# Patient Record
Sex: MALE | Race: WHITE | NOT HISPANIC OR LATINO | Employment: FULL TIME | URBAN - METROPOLITAN AREA
[De-identification: names, ages, dates, MRNs, and addresses within clinical notes are randomized per-mention and may not be internally consistent; named-entity substitution may affect disease eponyms.]

---

## 2022-03-07 ENCOUNTER — APPOINTMENT (OUTPATIENT)
Dept: RADIOLOGY | Facility: MEDICAL CENTER | Age: 27
End: 2022-03-07
Attending: EMERGENCY MEDICINE
Payer: COMMERCIAL

## 2022-03-07 ENCOUNTER — HOSPITAL ENCOUNTER (EMERGENCY)
Facility: MEDICAL CENTER | Age: 27
End: 2022-03-07
Attending: EMERGENCY MEDICINE
Payer: COMMERCIAL

## 2022-03-07 VITALS
HEIGHT: 74 IN | OXYGEN SATURATION: 97 % | BODY MASS INDEX: 23.48 KG/M2 | TEMPERATURE: 97.5 F | WEIGHT: 182.98 LBS | DIASTOLIC BLOOD PRESSURE: 65 MMHG | SYSTOLIC BLOOD PRESSURE: 107 MMHG | HEART RATE: 76 BPM | RESPIRATION RATE: 16 BRPM

## 2022-03-07 DIAGNOSIS — R10.84 GENERALIZED ABDOMINAL PAIN: ICD-10-CM

## 2022-03-07 DIAGNOSIS — Z87.19 HISTORY OF CROHN'S DISEASE: ICD-10-CM

## 2022-03-07 LAB
ALBUMIN SERPL BCP-MCNC: 4.5 G/DL (ref 3.2–4.9)
ALBUMIN/GLOB SERPL: 1.7 G/DL
ALP SERPL-CCNC: 51 U/L (ref 30–99)
ALT SERPL-CCNC: 10 U/L (ref 2–50)
ANION GAP SERPL CALC-SCNC: 11 MMOL/L (ref 7–16)
APPEARANCE UR: CLEAR
AST SERPL-CCNC: 16 U/L (ref 12–45)
BASOPHILS # BLD AUTO: 0.5 % (ref 0–1.8)
BASOPHILS # BLD: 0.04 K/UL (ref 0–0.12)
BILIRUB SERPL-MCNC: 0.3 MG/DL (ref 0.1–1.5)
BILIRUB UR QL STRIP.AUTO: NEGATIVE
BUN SERPL-MCNC: 10 MG/DL (ref 8–22)
CALCIUM SERPL-MCNC: 9.3 MG/DL (ref 8.4–10.2)
CHLORIDE SERPL-SCNC: 102 MMOL/L (ref 96–112)
CO2 SERPL-SCNC: 25 MMOL/L (ref 20–33)
COLOR UR: YELLOW
CREAT SERPL-MCNC: 0.68 MG/DL (ref 0.5–1.4)
EOSINOPHIL # BLD AUTO: 0.25 K/UL (ref 0–0.51)
EOSINOPHIL NFR BLD: 3.1 % (ref 0–6.9)
ERYTHROCYTE [DISTWIDTH] IN BLOOD BY AUTOMATED COUNT: 38.7 FL (ref 35.9–50)
GLOBULIN SER CALC-MCNC: 2.7 G/DL (ref 1.9–3.5)
GLUCOSE SERPL-MCNC: 112 MG/DL (ref 65–99)
GLUCOSE UR STRIP.AUTO-MCNC: NEGATIVE MG/DL
HCT VFR BLD AUTO: 40.5 % (ref 42–52)
HGB BLD-MCNC: 13.4 G/DL (ref 14–18)
IMM GRANULOCYTES # BLD AUTO: 0.02 K/UL (ref 0–0.11)
IMM GRANULOCYTES NFR BLD AUTO: 0.2 % (ref 0–0.9)
KETONES UR STRIP.AUTO-MCNC: NEGATIVE MG/DL
LEUKOCYTE ESTERASE UR QL STRIP.AUTO: NEGATIVE
LIPASE SERPL-CCNC: 13 U/L (ref 7–58)
LYMPHOCYTES # BLD AUTO: 1.89 K/UL (ref 1–4.8)
LYMPHOCYTES NFR BLD: 23.3 % (ref 22–41)
MCH RBC QN AUTO: 28.3 PG (ref 27–33)
MCHC RBC AUTO-ENTMCNC: 33.1 G/DL (ref 33.7–35.3)
MCV RBC AUTO: 85.4 FL (ref 81.4–97.8)
MICRO URNS: NORMAL
MONOCYTES # BLD AUTO: 0.45 K/UL (ref 0–0.85)
MONOCYTES NFR BLD AUTO: 5.5 % (ref 0–13.4)
NEUTROPHILS # BLD AUTO: 5.46 K/UL (ref 1.82–7.42)
NEUTROPHILS NFR BLD: 67.4 % (ref 44–72)
NITRITE UR QL STRIP.AUTO: NEGATIVE
NRBC # BLD AUTO: 0 K/UL
NRBC BLD-RTO: 0 /100 WBC
PH UR STRIP.AUTO: 7 [PH] (ref 5–8)
PLATELET # BLD AUTO: 333 K/UL (ref 164–446)
PMV BLD AUTO: 9.8 FL (ref 9–12.9)
POTASSIUM SERPL-SCNC: 4 MMOL/L (ref 3.6–5.5)
PROT SERPL-MCNC: 7.2 G/DL (ref 6–8.2)
PROT UR QL STRIP: NEGATIVE MG/DL
RBC # BLD AUTO: 4.74 M/UL (ref 4.7–6.1)
RBC UR QL AUTO: NEGATIVE
SODIUM SERPL-SCNC: 138 MMOL/L (ref 135–145)
SP GR UR STRIP.AUTO: 1.02
WBC # BLD AUTO: 8.1 K/UL (ref 4.8–10.8)

## 2022-03-07 PROCEDURE — 96374 THER/PROPH/DIAG INJ IV PUSH: CPT

## 2022-03-07 PROCEDURE — 83690 ASSAY OF LIPASE: CPT

## 2022-03-07 PROCEDURE — 74177 CT ABD & PELVIS W/CONTRAST: CPT

## 2022-03-07 PROCEDURE — 700111 HCHG RX REV CODE 636 W/ 250 OVERRIDE (IP): Performed by: EMERGENCY MEDICINE

## 2022-03-07 PROCEDURE — 85025 COMPLETE CBC W/AUTO DIFF WBC: CPT

## 2022-03-07 PROCEDURE — 81003 URINALYSIS AUTO W/O SCOPE: CPT

## 2022-03-07 PROCEDURE — 700105 HCHG RX REV CODE 258: Performed by: EMERGENCY MEDICINE

## 2022-03-07 PROCEDURE — 99285 EMERGENCY DEPT VISIT HI MDM: CPT

## 2022-03-07 PROCEDURE — 80053 COMPREHEN METABOLIC PANEL: CPT

## 2022-03-07 PROCEDURE — 96375 TX/PRO/DX INJ NEW DRUG ADDON: CPT

## 2022-03-07 PROCEDURE — 36415 COLL VENOUS BLD VENIPUNCTURE: CPT

## 2022-03-07 PROCEDURE — 700117 HCHG RX CONTRAST REV CODE 255: Performed by: EMERGENCY MEDICINE

## 2022-03-07 RX ORDER — OMEPRAZOLE 40 MG/1
40 CAPSULE, DELAYED RELEASE ORAL DAILY
Qty: 30 CAPSULE | Refills: 0 | Status: SHIPPED | OUTPATIENT
Start: 2022-03-07

## 2022-03-07 RX ORDER — ONDANSETRON 2 MG/ML
4 INJECTION INTRAMUSCULAR; INTRAVENOUS ONCE
Status: COMPLETED | OUTPATIENT
Start: 2022-03-07 | End: 2022-03-07

## 2022-03-07 RX ORDER — HYDROCODONE BITARTRATE AND ACETAMINOPHEN 5; 325 MG/1; MG/1
1 TABLET ORAL EVERY 4 HOURS PRN
Qty: 10 TABLET | Refills: 0 | Status: SHIPPED | OUTPATIENT
Start: 2022-03-07 | End: 2022-03-10

## 2022-03-07 RX ORDER — SODIUM CHLORIDE 9 MG/ML
1000 INJECTION, SOLUTION INTRAVENOUS ONCE
Status: COMPLETED | OUTPATIENT
Start: 2022-03-07 | End: 2022-03-07

## 2022-03-07 RX ORDER — MORPHINE SULFATE 10 MG/ML
8 INJECTION, SOLUTION INTRAMUSCULAR; INTRAVENOUS ONCE
Status: COMPLETED | OUTPATIENT
Start: 2022-03-07 | End: 2022-03-07

## 2022-03-07 RX ORDER — PREDNISONE 20 MG/1
40 TABLET ORAL DAILY
Qty: 8 TABLET | Refills: 0 | Status: SHIPPED | OUTPATIENT
Start: 2022-03-07 | End: 2022-03-11

## 2022-03-07 RX ADMIN — IOHEXOL 100 ML: 350 INJECTION, SOLUTION INTRAVENOUS at 16:42

## 2022-03-07 RX ADMIN — ONDANSETRON 4 MG: 2 INJECTION INTRAMUSCULAR; INTRAVENOUS at 16:01

## 2022-03-07 RX ADMIN — MORPHINE SULFATE 8 MG: 10 INJECTION INTRAVENOUS at 16:01

## 2022-03-07 RX ADMIN — SODIUM CHLORIDE 1000 ML: 9 INJECTION, SOLUTION INTRAVENOUS at 16:00

## 2022-03-07 ASSESSMENT — PAIN DESCRIPTION - DESCRIPTORS: DESCRIPTORS: CRAMPING

## 2022-03-07 ASSESSMENT — PAIN DESCRIPTION - PAIN TYPE
TYPE: ACUTE PAIN
TYPE: ACUTE PAIN

## 2022-03-07 NOTE — ED NOTES
IV started with no blood drawn.  Patient report she moved here two months ago and does not know anyone here for a ride home.

## 2022-03-07 NOTE — ED PROVIDER NOTES
"ED Provider Note    ED Provider Note    Primary care provider: No primary care provider on file.  Means of arrival: Walk-in  History obtained from: Patient    CHIEF COMPLAINT  Chief Complaint   Patient presents with   • Abdominal Pain     Seen at 3:36 PM.   HPI  Harsh Dupont is a 26 y.o. male to female, currently in transition who presents to the Emergency Department with gradual onset of severe diffuse abdominal pain beginning at around 8 AM this morning.  The patient does have a history of Crohn's disease but she has been in remission for the past 6 years, she has not required any medications for years but previously was on Remicade.    The pain is worsened by movement and standing in erect position.  The patient has never had pain like this in the past and does not feel similar to Crohn's disease.  She denies any changes in bowel habits, last bowel movement was this morning and was normal, denies any hematochezia or melena.  No recent fevers, chills, vomiting, dysuria.  No prior history of abdominal surgeries.  Has not had sexual reassignment surgery.    REVIEW OF SYSTEMS  See HPI,   Remainder of ROS negative.     PAST MEDICAL HISTORY   Crohn's disease    SURGICAL HISTORY  patient denies any surgical history    SOCIAL HISTORY  Social History     Tobacco Use   • Smoking status: Current Some Day Smoker   • Smokeless tobacco: Never Used   Substance Use Topics   • Alcohol use: Yes     Alcohol/week: 2.4 oz     Types: 4 Cans of beer per week   • Drug use: Never      Social History     Substance and Sexual Activity   Drug Use Never   MTF  FAMILY HISTORY  History reviewed. No pertinent family history.    CURRENT MEDICATIONS  Reviewed.  See Encounter Summary.     ALLERGIES  Allergies   Allergen Reactions   • Penicillins        PHYSICAL EXAM  VITAL SIGNS: /65   Pulse 76   Temp 36.4 °C (97.5 °F) (Temporal)   Resp 16   Ht 1.88 m (6' 2\")   Wt 83 kg (182 lb 15.7 oz)   SpO2 97%   BMI 23.49 kg/m² "   Constitutional: Awake, alert in no apparent distress.  Appears uncomfortable.  HENT: Normocephalic, Bilateral external ears normal. Nose normal.   Eyes: Conjunctiva normal, non-icteric, EOMI.    Thorax & Lungs: Easy unlabored respirations, Clear to ascultation bilaterally.  Cardiovascular: Regular rate, Regular rhythm, No murmurs, rubs or gallops. Bilateral pulses symmetrical.   Abdomen:  Soft, diffuse abdominal tenderness with what appears to be some right-sided predominance, may be slightly more uncomfortable in the right upper quadrant than the right lower, no rebound, no guarding, negative Rovsing's, nondistended, normal active bowel sounds.   :    Skin: Visualized skin is  Dry, No erythema, No rash.   Musculoskeletal:   No cyanosis, clubbing or edema. No leg asymmetry.   Neurologic: Alert, Grossly non-focal.   Psychiatric: Normal affect, Normal mood  Lymphatic:  No cervical LAD      RADIOLOGY  CT-ABDOMEN-PELVIS WITH   Final Result      1.  Trace free fluid in the pelvis, abnormal finding in a male      2.  However, no inflammatory process is identified within the abdomen or pelvis. Follow-up imaging may be indicated if symptoms persist or worsen            COURSE & MEDICAL DECISION MAKING  Pertinent Labs & Imaging studies reviewed. (See chart for details)    Differential diagnoses include but are not limited to: Crohn's disease, appendicitis, cholecystitis    3:36 PM - Medical record reviewed, patient seen and examined at bedside.    6 PM jonel test results, plan for discharge, the patient has been pain-free for the past hour.    Decision Making:  This is a pleasant 26 y.o. year old female who presents with gradual onset of severe abdominal pain that appeared to have in the emergency department.  SHe initially arrived tachycardic, this improved with IV fluids.  Labs are reassuring, she is not having leukocytosis or leftward shift, urinalysis is normal.  On the initial examination the pain was poorly  localized but predominately on the right side.  CT is essentially unremarkable, the appendix is visualized and thought to be normal.  No signs of biliary disease.  She does have trace free fluid in the pelvis which for a genotypical male is not a normal finding.  The exact etiology of this trace free fluid is not entirely clear.  The patient states that she did possibly missed 3 days of her hormone supplements over the weekend, I am not entirely sure if this would cause any type of free fluid, seemingly unlikely.  I think more likely would be that the patient had a very mild Crohn's flare.  In this case I will place her on a 5-day burst of prednisone with a prophylactic PPI given prior history of GERD and a few opioids as needed.    Because there is some significant diagnostic uncertainty, she is directed return for any severe abdominal pain or any other concern.        Discharge Medications:  Discharge Medication List as of 3/7/2022  6:12 PM      START taking these medications    Details   predniSONE (DELTASONE) 20 MG Tab Take 2 Tablets by mouth every day for 4 days., Disp-8 Tablet, R-0, Normal      HYDROcodone-acetaminophen (NORCO) 5-325 MG Tab per tablet Take 1 Tablet by mouth every four hours as needed for up to 3 days., Disp-10 Tablet, R-0, Normal      omeprazole (PRILOSEC) 40 MG delayed-release capsule Take 1 Capsule by mouth every day., Disp-30 Capsule, R-0, Normal             The patient was discharged home (see d/c instructions) was told to return immediately for any signs or symptoms listed, or any worsening at all.  The patient verbally agreed to the discharge precautions and follow-up plan which is documented in EPIC.        FINAL IMPRESSION  1. Generalized abdominal pain    2. History of Crohn's disease

## 2022-03-07 NOTE — ED TRIAGE NOTES
Chief Complaint   Patient presents with   • Abdominal Pain      pt complains of generalized mid abdominal pain with nausea. Pt has history of chron's disease. Pt is taking hormones for transitioning to female.

## 2023-05-20 ENCOUNTER — OFFICE VISIT (OUTPATIENT)
Dept: URGENT CARE | Facility: URGENT CARE | Age: 28
End: 2023-05-20

## 2023-05-20 VITALS
TEMPERATURE: 97.8 F | RESPIRATION RATE: 18 BRPM | DIASTOLIC BLOOD PRESSURE: 64 MMHG | SYSTOLIC BLOOD PRESSURE: 108 MMHG | HEIGHT: 73 IN | BODY MASS INDEX: 21.2 KG/M2 | HEART RATE: 77 BPM | OXYGEN SATURATION: 97 % | WEIGHT: 160 LBS

## 2023-05-20 DIAGNOSIS — J45.21 MILD INTERMITTENT ASTHMA WITH EXACERBATION: Primary | ICD-10-CM

## 2023-05-20 DIAGNOSIS — F41.9 ANXIETY: ICD-10-CM

## 2023-05-20 DIAGNOSIS — F17.200 SMOKER: ICD-10-CM

## 2023-05-20 PROCEDURE — 99204 OFFICE O/P NEW MOD 45 MIN: CPT | Performed by: NURSE PRACTITIONER

## 2023-05-20 RX ORDER — BUPROPION HYDROCHLORIDE 100 MG/1
100 TABLET ORAL 2 TIMES DAILY
COMMUNITY

## 2023-05-20 RX ORDER — PREDNISONE 20 MG/1
40 TABLET ORAL DAILY
Qty: 10 TABLET | Refills: 0 | Status: SHIPPED | OUTPATIENT
Start: 2023-05-20 | End: 2023-05-25

## 2023-05-21 NOTE — PATIENT INSTRUCTIONS
Prednisone albuterol for wheezing reactive airway flare  Pt declines chest x-ray here today  No obvious signs of pneumonia - fever, thick dark sputum  Low well's risk score for DVT PE  Wellbutrin bronchitis can happen as a side effect  Also allergy component with [place] poll[place]  Zyrtec  Rest! Your body needs more rest to heal.  Drink plenty of fluids (warm fluids like tea or soup are soothing and reduce cough)  Sit in the bathroom with a hot shower running and breathe in the steam.  Honey may soothe your sore throat and help manage your cough- may take straight or in warm water with lemon juice.  Avoid smoke (cigarettes, bonfires, fireplace, wood burning stoves).  Take Tylenol or an NSAID such as ibuprofen or naproxen as needed for pain.  Delsym (dextromethorphan polistirex) is an over the counter cough medication that lasts 12 hours.   Mucinex or Robitussin (guiafenesin) thin mucus and may help it to loosen more quickly  Good handwashing is the best way to prevent spread of germs  Present to emergency room if you develop trouble breathing, swallowing or cough-up blood, chest, fainting, calf pain redness warmth swelling.  Follow up with your primary care provider if symptoms worsen or fail to improve as expected.

## 2023-05-21 NOTE — PROGRESS NOTES
"Chief Complaint   Patient presents with     Urgent Care     Breathing Problem     Shortness of breath since Friday morning.       SUBJECTIVE:  Maycol Irwin is a 27 year old male who presents to the clinic today with cough shortness of breath runny stuffy nose allergy symptoms feeling winded tightness in the midsternal area over the last 2 days.  Patient recently had a road trip from [place] to [place].  They took Zyrtec and albuterol at their friend's house without much relief.  Has an asthma history that is more so exercise-induced as an adult.  Declines fever sweats chills hemoptysis thick sputum current calf pain swelling redness bulging varicose vein DVT PE history.  Patient relays that they did note brief anterior shin pain that is gone now.  They recently started Wellbutrin and cut back on smoking.  About 7 cigarettes a day.  Patient also endorses some anxiety which could confound the symptoms.  They fly back to [place] tomorrow and can see their family doctor there where they have medical assistance for payments.  Proper English    No past medical history on file.  buPROPion (WELLBUTRIN) 100 MG tablet, Take 100 mg by mouth 2 times daily  estrogen conj (PREMARIN) 0.3 MG tablet, Take 0.3 mg by mouth daily  estrogen conj (PREMARIN) 0.3 MG tablet, Take 0.3 mg by mouth daily    No current facility-administered medications on file prior to visit.    Social History     Tobacco Use     Smoking status: Not on file     Smokeless tobacco: Not on file   Substance Use Topics     Alcohol use: Not on file     Allergies   Allergen Reactions     Penicillins        Review of Systems   All systems negative except for those listed above in HPI.    EXAM:   /64   Pulse 77   Temp 97.8  F (36.6  C) (Temporal)   Resp 18   Ht 1.854 m (6' 1\")   Wt 72.6 kg (160 lb)   SpO2 97%   BMI 21.11 kg/m      Physical Exam  Vitals reviewed.   Constitutional:       General: He is not in acute distress.     Appearance: Normal " appearance. He is not ill-appearing, toxic-appearing or diaphoretic.   HENT:      Head: Normocephalic and atraumatic.      Right Ear: Tympanic membrane and ear canal normal.      Left Ear: Tympanic membrane and ear canal normal.      Nose: Congestion and rhinorrhea present.      Mouth/Throat:      Mouth: Mucous membranes are moist.      Pharynx: Oropharynx is clear. No oropharyngeal exudate or posterior oropharyngeal erythema.   Eyes:      Extraocular Movements: Extraocular movements intact.      Conjunctiva/sclera: Conjunctivae normal.      Pupils: Pupils are equal, round, and reactive to light.   Cardiovascular:      Rate and Rhythm: Normal rate.      Pulses: Normal pulses.   Pulmonary:      Effort: Respiratory distress present.      Breath sounds: No stridor. Wheezing present. No rhonchi or rales.   Chest:      Chest wall: No tenderness.   Musculoskeletal:         General: Normal range of motion.      Cervical back: Normal range of motion and neck supple.      Right lower leg: No edema.      Left lower leg: No edema.      Comments: Bilateral calves measure 40 cm in circumfrence.  No erythema edema pitting edema bulging varicose vein tenderness.   Skin:     General: Skin is warm and dry.      Findings: No rash.   Neurological:      General: No focal deficit present.      Mental Status: He is alert and oriented to person, place, and time.      Motor: No weakness.   Psychiatric:         Mood and Affect: Mood normal.         Behavior: Behavior normal.       ASSESSMENT:    ICD-10-CM    1. Mild intermittent asthma with exacerbation  J45.21 predniSONE (DELTASONE) 20 MG tablet      2. Smoker  F17.200       3. Anxiety  F41.9         PLAN:    Prednisone albuterol for wheezing reactive airway flare  Pt declines chest x-ray here today due to no insurance  No obvious signs of pneumonia - fever, thick dark sputum  Low well's risk score for DVT PE  Wellbutrin bronchitis can happen as a side effect  Also allergy component with  [place] poll[place]  Zyrtec  Rest! Your body needs more rest to heal.  Drink plenty of fluids (warm fluids like tea or soup are soothing and reduce cough)  Sit in the bathroom with a hot shower running and breathe in the steam.  Honey may soothe your sore throat and help manage your cough- may take straight or in warm water with lemon juice.  Avoid smoke (cigarettes, bonfires, fireplace, wood burning stoves).  Take Tylenol or an NSAID such as ibuprofen or naproxen as needed for pain.  Delsym (dextromethorphan polistirex) is an over the counter cough medication that lasts 12 hours.   Mucinex or Robitussin (guiafenesin) thin mucus and may help it to loosen more quickly  Good handwashing is the best way to prevent spread of germs  Present to emergency room if you develop trouble breathing, swallowing or cough-up blood, chest, fainting, calf pain redness warmth swelling.  Follow up with your primary care provider if symptoms worsen or fail to improve as expected.  Follow up with primary care provider with any problems, questions or concerns or if symptoms worsen or fail to improve. Patient agreed to plan and verbalized understanding.    Isabella Edwards, [facility]